# Patient Record
(demographics unavailable — no encounter records)

---

## 2025-05-02 NOTE — DISCUSSION/SUMMARY
[de-identified] : I reviewed patient's left hip imaging and discussed his condition and treatment options.  I advised resting for 4-6 weeks.  Continue using crutches for the next week.  Follow up in 1-2 weeks.  Patient and his mother voiced understanding and agreement with the plan.

## 2025-05-02 NOTE — PHYSICAL EXAM
[4___] : flexion 4[unfilled]/5 [2+] : posterior tibialis pulse: 2+ [] : ambulation with crutches [Left] : left hip with pelvis [AP] : anteroposterior [Lateral] : lateral [Outside films reviewed] : Outside films reviewed [There are no fractures, subluxations or dislocations. No significant abnormalities are seen] : There are no fractures, subluxations or dislocations. No significant abnormalities are seen [TWNoteComboBox7] : flexion 100 degrees

## 2025-05-02 NOTE — DATA REVIEWED
[MRI] : MRI [Left] : left [Hip] : hip [Report was reviewed and noted in the chart] : The report was reviewed and noted in the chart [I reviewed the films/CD and agree] : I reviewed the films/CD and agree [FreeTextEntry1] : left iliacus and rectus femoris strains

## 2025-05-02 NOTE — HISTORY OF PRESENT ILLNESS
[de-identified] : Patient presents for LT hip pain evaluation. Patient states that he was playing baseball 4/24/2025 and when he was sliding into a base, he felt his hip pop. Patient was having pain in the anterior hip. Patient went to [facility] the next day for images (x-ray and MRI) and was given crutches. Patient states that he feels a bit better now but feels soreness in the LT hip and into his thigh. Patient has been taking Motrin prn.

## 2025-05-16 NOTE — DISCUSSION/SUMMARY
[de-identified] : I discussed his condition and treatment course.  Advised starting PT and HEP.  Follow up in 2 weeks.  Patient and his mother voiced understanding and agreement with the plan.

## 2025-05-16 NOTE — HISTORY OF PRESENT ILLNESS
[de-identified] : Patient is following up on LT hip pain. Patient states that the pain went away this past Saturday. Patient uses crutches only at school.

## 2025-05-16 NOTE — PHYSICAL EXAM
[4___] : flexion 4[unfilled]/5 [2+] : posterior tibialis pulse: 2+ [Left] : left hip with pelvis [AP] : anteroposterior [Lateral] : lateral [Outside films reviewed] : Outside films reviewed [There are no fractures, subluxations or dislocations. No significant abnormalities are seen] : There are no fractures, subluxations or dislocations. No significant abnormalities are seen [] : patient ambulates without assistive device [TWNoteComboBox7] : flexion 110 degrees

## 2025-05-28 NOTE — DISCUSSION/SUMMARY
[de-identified] : I discussed his condition and treatment course.  Continue PT and HEP.  May resume activities as tolerated.  Follow up in 4 weeks.  Patient and his mother voiced understanding and agreement with the plan.

## 2025-05-28 NOTE — PHYSICAL EXAM
[Left] : left hip [2+] : posterior tibialis pulse: 2+ [5___] : extension 5[unfilled]/5 [] : motor exam 5/5  throughout

## 2025-05-28 NOTE — HISTORY OF PRESENT ILLNESS
[de-identified] : Patient is following up on LT hip pain.  Patient reports doing well since last visit.  Denies any pain.  Unable to get PT appointment until tomorrow but has been performing home exercises.

## 2025-06-24 NOTE — DISCUSSION/SUMMARY
[de-identified] : I discussed his condition and treatment course.  Resume activities as tolerated.  Complete PT and HEP.  Follow up as needed.  Patient and his mother voiced understanding and agreement with the plan.

## 2025-06-24 NOTE — PHYSICAL EXAM
[Left] : left hip [5___] : extension 5[unfilled]/5 [2+] : posterior tibialis pulse: 2+ [] : no pain with hip extension

## 2025-06-24 NOTE — HISTORY OF PRESENT ILLNESS
[de-identified] : Patient is following up on LT hip pain.  States he is feeling a lot better compared to last visit. Good improvement with physical therapy, he has been going mostly 2x a week. Doing HEP.  Denies pain meds. 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